# Patient Record
Sex: MALE | HISPANIC OR LATINO | Employment: OTHER | URBAN - METROPOLITAN AREA
[De-identification: names, ages, dates, MRNs, and addresses within clinical notes are randomized per-mention and may not be internally consistent; named-entity substitution may affect disease eponyms.]

---

## 2023-06-29 ENCOUNTER — APPOINTMENT (OUTPATIENT)
Dept: CT IMAGING | Facility: CLINIC | Age: 23
End: 2023-06-29
Attending: EMERGENCY MEDICINE

## 2023-06-29 ENCOUNTER — HOSPITAL ENCOUNTER (EMERGENCY)
Facility: CLINIC | Age: 23
Discharge: HOME | End: 2023-06-29
Attending: EMERGENCY MEDICINE | Admitting: EMERGENCY MEDICINE

## 2023-06-29 VITALS
SYSTOLIC BLOOD PRESSURE: 145 MMHG | RESPIRATION RATE: 18 BRPM | WEIGHT: 315 LBS | TEMPERATURE: 97.5 F | OXYGEN SATURATION: 100 % | HEART RATE: 89 BPM | DIASTOLIC BLOOD PRESSURE: 87 MMHG

## 2023-06-29 DIAGNOSIS — J01.90 ACUTE SINUSITIS, RECURRENCE NOT SPECIFIED, UNSPECIFIED LOCATION: ICD-10-CM

## 2023-06-29 LAB
ANION GAP SERPL CALCULATED.3IONS-SCNC: 10 MMOL/L (ref 7–15)
BASOPHILS # BLD MANUAL: 0 10E3/UL (ref 0–0.2)
BASOPHILS NFR BLD MANUAL: 1 %
BUN SERPL-MCNC: 6.8 MG/DL (ref 6–20)
CALCIUM SERPL-MCNC: 8.7 MG/DL (ref 8.6–10)
CHLORIDE SERPL-SCNC: 103 MMOL/L (ref 98–107)
CREAT SERPL-MCNC: 0.65 MG/DL (ref 0.67–1.17)
DEPRECATED HCO3 PLAS-SCNC: 25 MMOL/L (ref 22–29)
EOSINOPHIL # BLD MANUAL: 0 10E3/UL (ref 0–0.7)
EOSINOPHIL NFR BLD MANUAL: 1 %
ERYTHROCYTE [DISTWIDTH] IN BLOOD BY AUTOMATED COUNT: 14.6 % (ref 10–15)
GFR SERPL CREATININE-BSD FRML MDRD: >90 ML/MIN/1.73M2
GLUCOSE SERPL-MCNC: 119 MG/DL (ref 70–99)
HCT VFR BLD AUTO: 46.3 % (ref 40–53)
HGB BLD-MCNC: 14.3 G/DL (ref 13.3–17.7)
HOLD SPECIMEN: NORMAL
HOLD SPECIMEN: NORMAL
LYMPHOCYTES # BLD MANUAL: 2.1 10E3/UL (ref 0.8–5.3)
LYMPHOCYTES NFR BLD MANUAL: 53 %
MCH RBC QN AUTO: 25.8 PG (ref 26.5–33)
MCHC RBC AUTO-ENTMCNC: 30.9 G/DL (ref 31.5–36.5)
MCV RBC AUTO: 83 FL (ref 78–100)
MONOCYTES # BLD MANUAL: 0.4 10E3/UL (ref 0–1.3)
MONOCYTES NFR BLD MANUAL: 10 %
NEUTROPHILS # BLD MANUAL: 1.4 10E3/UL (ref 1.6–8.3)
NEUTROPHILS NFR BLD MANUAL: 35 %
PLAT MORPH BLD: ABNORMAL
PLATELET # BLD AUTO: 241 10E3/UL (ref 150–450)
POTASSIUM SERPL-SCNC: 4.1 MMOL/L (ref 3.4–5.3)
RBC # BLD AUTO: 5.55 10E6/UL (ref 4.4–5.9)
RBC MORPH BLD: ABNORMAL
SODIUM SERPL-SCNC: 138 MMOL/L (ref 136–145)
TROPONIN T SERPL HS-MCNC: <6 NG/L
WBC # BLD AUTO: 3.9 10E3/UL (ref 4–11)

## 2023-06-29 PROCEDURE — 85007 BL SMEAR W/DIFF WBC COUNT: CPT | Performed by: EMERGENCY MEDICINE

## 2023-06-29 PROCEDURE — 36415 COLL VENOUS BLD VENIPUNCTURE: CPT | Performed by: EMERGENCY MEDICINE

## 2023-06-29 PROCEDURE — 82310 ASSAY OF CALCIUM: CPT | Performed by: EMERGENCY MEDICINE

## 2023-06-29 PROCEDURE — 85027 COMPLETE CBC AUTOMATED: CPT | Performed by: EMERGENCY MEDICINE

## 2023-06-29 PROCEDURE — 96361 HYDRATE IV INFUSION ADD-ON: CPT

## 2023-06-29 PROCEDURE — 70496 CT ANGIOGRAPHY HEAD: CPT

## 2023-06-29 PROCEDURE — 96360 HYDRATION IV INFUSION INIT: CPT

## 2023-06-29 PROCEDURE — 80048 BASIC METABOLIC PNL TOTAL CA: CPT | Performed by: EMERGENCY MEDICINE

## 2023-06-29 PROCEDURE — 258N000003 HC RX IP 258 OP 636: Performed by: EMERGENCY MEDICINE

## 2023-06-29 PROCEDURE — 250N000011 HC RX IP 250 OP 636: Performed by: EMERGENCY MEDICINE

## 2023-06-29 PROCEDURE — 84484 ASSAY OF TROPONIN QUANT: CPT | Performed by: EMERGENCY MEDICINE

## 2023-06-29 PROCEDURE — 93005 ELECTROCARDIOGRAM TRACING: CPT

## 2023-06-29 PROCEDURE — 70498 CT ANGIOGRAPHY NECK: CPT

## 2023-06-29 PROCEDURE — 99285 EMERGENCY DEPT VISIT HI MDM: CPT | Mod: 25

## 2023-06-29 PROCEDURE — 70450 CT HEAD/BRAIN W/O DYE: CPT | Mod: XS

## 2023-06-29 RX ORDER — IOPAMIDOL 755 MG/ML
500 INJECTION, SOLUTION INTRAVASCULAR ONCE
Status: COMPLETED | OUTPATIENT
Start: 2023-06-29 | End: 2023-06-29

## 2023-06-29 RX ADMIN — SODIUM CHLORIDE 100 ML: 9 INJECTION, SOLUTION INTRAVENOUS at 15:22

## 2023-06-29 RX ADMIN — IOPAMIDOL 75 ML: 755 INJECTION, SOLUTION INTRAVENOUS at 15:22

## 2023-06-29 ASSESSMENT — ACTIVITIES OF DAILY LIVING (ADL)
ADLS_ACUITY_SCORE: 35

## 2023-06-29 NOTE — ED TRIAGE NOTES
Pt arrives with headache for the last week but states it feels like he got hit in the face this morning states pain in located on the right side of face. Pt states chest pain that started yesterday and went into his arm, pain currently still in left arm. ABCs intact and AOx4.      Triage Assessment     Row Name 06/29/23 1326       Triage Assessment (Adult)    Airway WDL WDL       Respiratory WDL    Respiratory WDL WDL       Cardiac WDL    Cardiac WDL X       Chest Pain Assessment    Associated Signs/Symptoms hypertension

## 2023-06-29 NOTE — ED NOTES
PIT/Triage Evaluation    Patient presented with complaints of right frontal headache for the last week or so.  Pain initially began when he developed left-sided neck pain and pain in his left arm associated with some sort of swelling on his left neck that resolved.  He now only has persistent right-sided headache.  No fevers.    Exam is notable for:  Constitutional: Alert, attentive, GCS 15   HENT:   Neck: No tenderness, full range of motion  Eyes: EOM are normal, anicteric, conjugate gaze  CV: distal extremities warm, well perfused  Chest: Non-labored breathing on RA  GI:  non tender. No distension. No guarding or rebound.    Neurological: Alert, attentive, moving all extremities equally.   Skin: Skin is warm and dry.        Appropriate interventions for symptom management were initiated if applicable.  Appropriate diagnostic tests were initiated if indicated.    Important information for subsequent clinician:  23-year-old presenting for gradual onset persistent right-sided headache initially associated with some left-sided arm/neck and chest pain he attributed to swelling of his left lateral neck that resolved and he now has a persistent headache.  We will plan for CT imaging of his head, he has no lateralizing neurologic symptoms, I do not suspect CVA, dissection of cerebral vasculature.  We will do screening for chest pain as well.    I briefly evaluated the patient and developed an initial plan of care. I discussed this plan and explained that this brief interaction does not constitute a full evaluation. Patient/family understands that they should wait to be fully evaluated and discuss any test results with another clinician prior to leaving the hospital.    Jalen Wood MD  [employer]  3:15 PM 06/29/23          Jalen Wood MD  06/29/23 [id number]

## 2023-06-29 NOTE — ED PROVIDER NOTES
History     Chief Complaint:  Headache and Arm Pain       HPI   Russel Rodriguez is a 23 year old male with complaints of right frontal headache for the last week or so.  Pain initially began when he developed left-sided neck pain and pain in his left arm associated with some sort of swelling on his left neck that resolved.  He now only has persistent right-sided headache.  No fevers.    Independent Historian:   None - Patient Only    Review of External Notes:   None       Medications:    amoxicillin-clavulanate (AUGMENTIN) 875-125 MG tablet        Past Medical History:    No past medical history on file.    Past Surgical History:    No past surgical history on file.     Physical Exam   Patient Vitals for the past 24 hrs:   BP Temp Temp src Pulse Resp SpO2 Weight   06/29/23 1324 (!) 141/95 -- -- -- -- -- --   06/29/23 1322 -- 97.5  F (36.4  C) Temporal 70 20 98 % (!) 189 kg (416 lb 10.7 oz)        Physical Exam  Constitutional: Alert, attentive, GCS 15   HENT:   Neck: No tenderness, full range of motion  Eyes: EOM are normal, anicteric, conjugate gaze  CV: distal extremities warm, well perfused  Chest: Non-labored breathing on RA  GI:  non tender. No distension. No guarding or rebound.    Neurological: Alert, attentive, moving all extremities equally.   Skin: Skin is warm and dry.    Emergency Department Course   Imaging:  CTA Head Neck with Contrast   Final Result   IMPRESSION:   HEAD CTA:   1.  No significant intracranial stenosis. No aneurysm and no high flow   vascular malformation.      NECK CTA:   1.  No significant stenosis of the neck vessels by NASCET criteria. No   finding for dissection      MICHELLE CHAPPELL MD            SYSTEM ID:  [id number]      Head CT w/o contrast   Final Result   IMPRESSION:      1. Unremarkable appearance of the brain with no acute intracranial   abnormality.      2. Scattered mild mucosal thickening in the paranasal sinuses.      MICHELLE CHAPPELL MD            SYSTEM ID:  [id number]          Report per radiology    Laboratory:  Labs Ordered and Resulted from Time of ED Arrival to Time of ED Departure   BASIC METABOLIC PANEL - Abnormal       Result Value    Sodium 138      Potassium 4.1      Chloride 103      Carbon Dioxide (CO2) 25      Anion Gap 10      Urea Nitrogen 6.8      Creatinine 0.65 (*)     Calcium 8.7      Glucose 119 (*)     GFR Estimate >90     CBC WITH PLATELETS AND DIFFERENTIAL - Abnormal    WBC Count 3.9 (*)     RBC Count 5.55      Hemoglobin 14.3      Hematocrit 46.3      MCV 83      MCH 25.8 (*)     MCHC 30.9 (*)     RDW 14.6      Platelet Count 241     DIFFERENTIAL - Abnormal    % Neutrophils 35      % Lymphocytes 53      % Monocytes 10      % Eosinophils 1      % Basophils 1      Absolute Neutrophils 1.4 (*)     Absolute Lymphocytes 2.1      Absolute Monocytes 0.4      Absolute Eosinophils 0.0      Absolute Basophils 0.0      RBC Morphology Confirmed RBC Indices      Platelet Assessment        Value: Automated Count Confirmed. Platelet morphology is normal.   TROPONIN T, HIGH SENSITIVITY - Normal    Troponin T, High Sensitivity <6          Emergency Department Course & Assessments:    Interventions:  Medications   0.9% sodium chloride BOLUS (0 mLs Intravenous Stopped 6/29/23 1845)   iopamidol (ISOVUE-370) solution 500 mL (75 mLs Intravenous $Given 6/29/23 1522)      Independent Interpretation (X-rays, CTs, rhythm strip):  None    Consultations/Discussion of Management or Tests:  None        Social Determinants of Health affecting care:   None    Disposition:  The patient was discharged to home.     Impression & Plan      Medical Decision Making:  Previous healthy 23-year-old presenting for evaluation of right-sided headache, facial pain.  He initially reported that his symptoms began with left lateral neck swelling, pain into his arm and chest and then progressed into a headache.  He denies any fevers.  Screening EKG and troponin are negative with low suspicion for ACS.   Given his complaints of neck pain, CT angio of the head and neck was obtained, this was negative.  CT of his head demonstrates sinusitis and I suspect this is the etiology of his facial pain.  He has no lateralizing deficits, do not suspect stroke, notification for MRI.  CT findings were reviewed, I will place him on antibiotics due to duration of his pain greater than 1 and half weeks.  Return precautions reviewed over-the-counter medications reviewed and he was discharged.     Diagnosis:    ICD-10-CM    1. Acute sinusitis, recurrence not specified, unspecified location  J01.90            Discharge Medications:  Discharge Medication List as of 6/29/2023  7:06 PM      START taking these medications    Details   amoxicillin-clavulanate (AUGMENTIN) 875-125 MG tablet Take 1 tablet by mouth 2 times daily for 5 days, Disp-10 tablet, R-0, Local Print                Jalen Wood MD  [employer]  10:05 PM 06/29/23            Jalen Wood MD  06/29/23 [id number]

## 2023-06-30 LAB
ATRIAL RATE - MUSE: 80 BPM
DIASTOLIC BLOOD PRESSURE - MUSE: NORMAL MMHG
INTERPRETATION ECG - MUSE: NORMAL
P AXIS - MUSE: 12 DEGREES
PR INTERVAL - MUSE: 144 MS
QRS DURATION - MUSE: 90 MS
QT - MUSE: 368 MS
QTC - MUSE: 424 MS
R AXIS - MUSE: 44 DEGREES
SYSTOLIC BLOOD PRESSURE - MUSE: NORMAL MMHG
T AXIS - MUSE: 19 DEGREES
VENTRICULAR RATE- MUSE: 80 BPM